# Patient Record
Sex: MALE | Race: WHITE | NOT HISPANIC OR LATINO | Employment: OTHER | URBAN - METROPOLITAN AREA
[De-identification: names, ages, dates, MRNs, and addresses within clinical notes are randomized per-mention and may not be internally consistent; named-entity substitution may affect disease eponyms.]

---

## 2017-12-04 ENCOUNTER — HOSPITAL ENCOUNTER (OUTPATIENT)
Dept: RADIOLOGY | Facility: MEDICAL CENTER | Age: 72
End: 2017-12-04

## 2017-12-15 ENCOUNTER — APPOINTMENT (OUTPATIENT)
Dept: RADIOLOGY | Facility: MEDICAL CENTER | Age: 72
End: 2017-12-15
Attending: SURGERY
Payer: MEDICARE

## 2017-12-15 ENCOUNTER — HOSPITAL ENCOUNTER (OUTPATIENT)
Facility: MEDICAL CENTER | Age: 72
End: 2017-12-15
Attending: SURGERY | Admitting: SURGERY
Payer: MEDICARE

## 2017-12-15 VITALS
BODY MASS INDEX: 29.57 KG/M2 | WEIGHT: 195.11 LBS | HEART RATE: 84 BPM | OXYGEN SATURATION: 91 % | SYSTOLIC BLOOD PRESSURE: 156 MMHG | HEIGHT: 68 IN | DIASTOLIC BLOOD PRESSURE: 88 MMHG | TEMPERATURE: 98.8 F | RESPIRATION RATE: 15 BRPM

## 2017-12-15 DIAGNOSIS — Z41.9 SURGERY, ELECTIVE: ICD-10-CM

## 2017-12-15 LAB
ALBUMIN SERPL BCP-MCNC: 3.6 G/DL (ref 3.2–4.9)
ALBUMIN/GLOB SERPL: 1.1 G/DL
ALP SERPL-CCNC: 547 U/L (ref 30–99)
ALT SERPL-CCNC: 65 U/L (ref 2–50)
ANION GAP SERPL CALC-SCNC: 6 MMOL/L (ref 0–11.9)
APTT PPP: 32.1 SEC (ref 24.7–36)
AST SERPL-CCNC: 105 U/L (ref 12–45)
BILIRUB SERPL-MCNC: 3 MG/DL (ref 0.1–1.5)
BUN SERPL-MCNC: 17 MG/DL (ref 8–22)
CALCIUM SERPL-MCNC: 9 MG/DL (ref 8.5–10.5)
CHLORIDE SERPL-SCNC: 104 MMOL/L (ref 96–112)
CO2 SERPL-SCNC: 22 MMOL/L (ref 20–33)
CREAT SERPL-MCNC: 0.63 MG/DL (ref 0.5–1.4)
ERYTHROCYTE [DISTWIDTH] IN BLOOD BY AUTOMATED COUNT: 48.9 FL (ref 35.9–50)
GFR SERPL CREATININE-BSD FRML MDRD: >60 ML/MIN/1.73 M 2
GLOBULIN SER CALC-MCNC: 3.2 G/DL (ref 1.9–3.5)
GLUCOSE SERPL-MCNC: 110 MG/DL (ref 65–99)
HCT VFR BLD AUTO: 44.5 % (ref 42–52)
HGB BLD-MCNC: 14.6 G/DL (ref 14–18)
MCH RBC QN AUTO: 29 PG (ref 27–33)
MCHC RBC AUTO-ENTMCNC: 32.8 G/DL (ref 33.7–35.3)
MCV RBC AUTO: 88.5 FL (ref 81.4–97.8)
PLATELET # BLD AUTO: 179 K/UL (ref 164–446)
PMV BLD AUTO: 10.9 FL (ref 9–12.9)
POTASSIUM SERPL-SCNC: 4.9 MMOL/L (ref 3.6–5.5)
PROT SERPL-MCNC: 6.8 G/DL (ref 6–8.2)
RBC # BLD AUTO: 5.03 M/UL (ref 4.7–6.1)
SODIUM SERPL-SCNC: 132 MMOL/L (ref 135–145)
WBC # BLD AUTO: 9.6 K/UL (ref 4.8–10.8)

## 2017-12-15 PROCEDURE — 160036 HCHG PACU - EA ADDL 30 MINS PHASE I: Performed by: SURGERY

## 2017-12-15 PROCEDURE — 700101 HCHG RX REV CODE 250

## 2017-12-15 PROCEDURE — 160035 HCHG PACU - 1ST 60 MINS PHASE I: Performed by: SURGERY

## 2017-12-15 PROCEDURE — 160009 HCHG ANES TIME/MIN: Performed by: SURGERY

## 2017-12-15 PROCEDURE — C1788 PORT, INDWELLING, IMP: HCPCS | Performed by: SURGERY

## 2017-12-15 PROCEDURE — 700111 HCHG RX REV CODE 636 W/ 250 OVERRIDE (IP)

## 2017-12-15 PROCEDURE — 160039 HCHG SURGERY MINUTES - EA ADDL 1 MIN LEVEL 3: Performed by: SURGERY

## 2017-12-15 PROCEDURE — 160028 HCHG SURGERY MINUTES - 1ST 30 MINS LEVEL 3: Performed by: SURGERY

## 2017-12-15 PROCEDURE — 160048 HCHG OR STATISTICAL LEVEL 1-5: Performed by: SURGERY

## 2017-12-15 PROCEDURE — 85730 THROMBOPLASTIN TIME PARTIAL: CPT

## 2017-12-15 PROCEDURE — 160025 RECOVERY II MINUTES (STATS): Performed by: SURGERY

## 2017-12-15 PROCEDURE — 80053 COMPREHEN METABOLIC PANEL: CPT

## 2017-12-15 PROCEDURE — 160046 HCHG PACU - 1ST 60 MINS PHASE II: Performed by: SURGERY

## 2017-12-15 PROCEDURE — A6402 STERILE GAUZE <= 16 SQ IN: HCPCS | Performed by: SURGERY

## 2017-12-15 PROCEDURE — 85027 COMPLETE CBC AUTOMATED: CPT

## 2017-12-15 PROCEDURE — 501838 HCHG SUTURE GENERAL: Performed by: SURGERY

## 2017-12-15 PROCEDURE — 160002 HCHG RECOVERY MINUTES (STAT): Performed by: SURGERY

## 2017-12-15 DEVICE — POWER PORTMRI COMPATABLE: Type: IMPLANTABLE DEVICE | Status: FUNCTIONAL

## 2017-12-15 RX ORDER — BUPIVACAINE HYDROCHLORIDE AND EPINEPHRINE 2.5; 5 MG/ML; UG/ML
INJECTION, SOLUTION EPIDURAL; INFILTRATION; INTRACAUDAL; PERINEURAL
Status: DISCONTINUED | OUTPATIENT
Start: 2017-12-15 | End: 2017-12-15 | Stop reason: HOSPADM

## 2017-12-15 RX ORDER — TRAMADOL HYDROCHLORIDE 50 MG/1
50 TABLET ORAL EVERY 6 HOURS PRN
Qty: 30 TAB | Refills: 1 | Status: SHIPPED | OUTPATIENT
Start: 2017-12-15 | End: 2018-12-15

## 2017-12-15 RX ORDER — SODIUM CHLORIDE, SODIUM LACTATE, POTASSIUM CHLORIDE, CALCIUM CHLORIDE 600; 310; 30; 20 MG/100ML; MG/100ML; MG/100ML; MG/100ML
INJECTION, SOLUTION INTRAVENOUS CONTINUOUS
Status: DISCONTINUED | OUTPATIENT
Start: 2017-12-15 | End: 2017-12-15 | Stop reason: HOSPADM

## 2017-12-15 RX ORDER — LIDOCAINE HYDROCHLORIDE 10 MG/ML
0.5 INJECTION, SOLUTION INFILTRATION; PERINEURAL
Status: DISCONTINUED | OUTPATIENT
Start: 2017-12-15 | End: 2017-12-15 | Stop reason: HOSPADM

## 2017-12-15 RX ORDER — ASPIRIN 81 MG/1
81 TABLET, CHEWABLE ORAL DAILY
COMMUNITY
End: 2018-02-12

## 2017-12-15 RX ORDER — HEPARIN SODIUM,PORCINE 1000/ML
VIAL (ML) INJECTION
Status: DISCONTINUED | OUTPATIENT
Start: 2017-12-15 | End: 2017-12-15 | Stop reason: HOSPADM

## 2017-12-15 RX ORDER — PYRIDOXINE HCL (VITAMIN B6) 100 MG
TABLET ORAL
COMMUNITY

## 2017-12-15 RX ORDER — LIDOCAINE AND PRILOCAINE 25; 25 MG/G; MG/G
1 CREAM TOPICAL
Status: DISCONTINUED | OUTPATIENT
Start: 2017-12-15 | End: 2017-12-15 | Stop reason: HOSPADM

## 2017-12-15 RX ORDER — PROMETHAZINE HYDROCHLORIDE 25 MG/1
12.5 TABLET ORAL EVERY 6 HOURS PRN
Qty: 30 TAB | Refills: 0 | Status: SHIPPED | OUTPATIENT
Start: 2017-12-15

## 2017-12-15 RX ORDER — HALOPERIDOL 5 MG/ML
INJECTION INTRAMUSCULAR
Status: DISCONTINUED
Start: 2017-12-15 | End: 2017-12-15 | Stop reason: HOSPADM

## 2017-12-15 ASSESSMENT — PAIN SCALES - GENERAL
PAINLEVEL_OUTOF10: 0

## 2017-12-15 NOTE — DISCHARGE INSTRUCTIONS
ACTIVITY: Rest and take it easy for the first 24 hours.  A responsible adult is recommended to remain with you during that time.  It is normal to feel sleepy.  We encourage you to not do anything that requires balance, judgment or coordination.    MILD FLU-LIKE SYMPTOMS ARE NORMAL. YOU MAY EXPERIENCE GENERALIZED MUSCLE ACHES, THROAT IRRITATION, HEADACHE AND/OR SOME NAUSEA.    FOR 24 HOURS DO NOT:  Drive, operate machinery or run household appliances.  Drink beer or alcoholic beverages.   Make important decisions or sign legal documents.    SPECIAL INSTRUCTIONS: *Follow instructions Dr SALAS has giving you**    DIET: To avoid nausea, slowly advance diet as tolerated, avoiding spicy or greasy foods for the first day.  Add more substantial food to your diet according to your physician's instructions.  Babies can be fed formula or breast milk as soon as they are hungry.  INCREASE FLUIDS AND FIBER TO AVOID CONSTIPATION.    SURGICAL DRESSING/BATHING: *You may shower tomorrow, do no submerge site until Dr says so  **    FOLLOW-UP APPOINTMENT:  A follow-up appointment should be arranged with your doctor,  call to schedule. [phone]    You should CALL YOUR PHYSICIAN if you develop:  Fever greater than 101 degrees F.  Pain not relieved by medication, or persistent nausea or vomiting.  Excessive bleeding (blood soaking through dressing) or unexpected drainage from the wound.  Extreme redness or swelling around the incision site, drainage of pus or foul smelling drainage.  Inability to urinate or empty your bladder within 8 hours.  Problems with breathing or chest pain.    You should call 911 if you develop problems with breathing or chest pain.  If you are unable to contact your doctor or surgical center, you should go to the nearest emergency room or urgent care center.  Physician's telephone #: *[phone]**    If any questions arise, call your doctor.  If your doctor is not available, please feel free to call the  Surgical Center at [phone].  The Center is open Monday through Friday from 7AM to 7PM.  You can also call the HEALTH HOTLINE open 24 hours/day, 7 days/week and speak to a nurse at [phone], or toll free at [phone].    A registered nurse may call you a few days after your surgery to see how you are doing after your procedure.    MEDICATIONS: Resume taking daily medication.  Take prescribed pain medication with food.  If no medication is prescribed, you may take non-aspirin pain medication if needed.  PAIN MEDICATION CAN BE VERY CONSTIPATING.  Take a stool softener or laxative such as senokot, pericolace, or milk of magnesia if needed.    Prescription given for *Ultram, Phenergan**.  Last pain medication given at *NONE**.    If your physician has prescribed pain medication that includes Acetaminophen (Tylenol), do not take additional Acetaminophen (Tylenol) while taking the prescribed medication.    Depression / Suicide Risk    As you are discharged from this [facility] facility, it is important to learn how to keep safe from harming yourself.    Recognize the warning signs:  · Abrupt changes in personality, positive or negative- including increase in energy   · Giving away possessions  · Change in eating patterns- significant weight changes-  positive or negative  · Change in sleeping patterns- unable to sleep or sleeping all the time   · Unwillingness or inability to communicate  · Depression  · Unusual sadness, discouragement and loneliness  · Talk of wanting to die  · Neglect of personal appearance   · Rebelliousness- reckless behavior  · Withdrawal from people/activities they love  · Confusion- inability to concentrate     If you or a loved one observes any of these behaviors or has concerns about self-harm, here's what you can do:  · Talk about it- your feelings and reasons for harming yourself  · Remove any means that you might use to hurt yourself (examples: pills, rope, extension cords,  firearm)  · Get professional help from the community (Mental Health, Substance Abuse, psychological counseling)  · Do not be alone:Call your Safe Contact- someone whom you trust who will be there for you.  · Call your local CRISIS HOTLINE 995-0806 or 973-574-8488  · Call your local Children's Mobile Crisis Response Team [place] (890) 691-6375 or www.Clarient  · Call the toll free National Suicide Prevention Hotlines   · National Suicide Prevention Lifeline 555-178-YXHX (0842)  · National Hope Line Network 800-SUICIDE (369-1671)

## 2017-12-15 NOTE — OP REPORT
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    MEDICAL ONCOLOGIST:  Dr. Gray.    RADIATION ONCOLOGIST:  Dr. Rodriguez.    PROCEDURE DONE:  Right cephalic cutdown PowerPort.    FINDINGS:  Fluoroscopically placed catheter to the superior vena cava atrial   junction with good inflow and outflow x3.    COMPLICATIONS:  No complications.    ESTIMATED BLOOD LOSS:  Less than 5 mL.    DESCRIPTION OF PROCEDURE:  The patient was brought to OR, placed under general   anesthetic with right arm tucked, left arm out, prepped with chlorhexidine   prep on the right neck, shoulder and chest wall.  The patient at that point   was given 5 mL of 0.5% Marcaine in the area of the right deltopectoral groove   and incision made with #10 blade for approximately 3-4 cm.  Using Bovie   electrocautery, we encountered a cephalic groove.  We opened up with   Metzenbaum scissors and found that the patient had a large cephalic vein.  We   got proximal and distal control with 3-0 Vicryl sutures.  The distal was tied.    The proximal was elevated.  A small venotomy was created and a 10-Kinyarwanda   flush PowerPort catheter was inserted under fluoroscopic guidance to the   superior vena cava atrial junction.  Once in position, it had great inflow and   outflow.  The proximal tie was tied.  At that point, the catheter was clamped   with a rubber shot.  Pocket was created with Bovie electrocautery.  The   catheter was cut, attached with the cuff to the flush PowerPort and at that   point, it was tested for a second time having great inflow and outflow.  At   that point, it was secured to the pectoral fascia with interrupted 2-0   Prolenes.  At that point, it was tested for a third time and found that to be    working extremely well.  Once completed, we then gave a total of 30 mL of half   strength Marcaine with epinephrine in the area of incision and soft tissue.    The soft tissue was brought together with interrupted 3-0 Vicryl, skin was   closed with 4-0 Monocryl.  Benzoin strips applied.  Patient was covered with   2x2, Tegaderm, extubated, and transferred to recovery.       ____________________________________     MD SHAJI LEE / NTS    DD:  12/15/2017 08:05:34  DT:  12/15/2017 08:57:56    D#:  [id number]  Job#:  [id number]    cc: DAVIN ALBERTO MD

## 2017-12-18 ENCOUNTER — HOSPITAL ENCOUNTER (OUTPATIENT)
Dept: RADIATION ONCOLOGY | Facility: MEDICAL CENTER | Age: 72
End: 2017-12-31
Attending: RADIOLOGY
Payer: MEDICARE

## 2017-12-18 ENCOUNTER — HOSPITAL ENCOUNTER (OUTPATIENT)
Dept: RADIOLOGY | Facility: MEDICAL CENTER | Age: 72
End: 2017-12-18

## 2017-12-18 VITALS
SYSTOLIC BLOOD PRESSURE: 147 MMHG | OXYGEN SATURATION: 95 % | HEIGHT: 68 IN | BODY MASS INDEX: 30.01 KG/M2 | WEIGHT: 198 LBS | DIASTOLIC BLOOD PRESSURE: 80 MMHG | TEMPERATURE: 98 F | HEART RATE: 73 BPM

## 2017-12-18 DIAGNOSIS — C22.1 CHOLANGIOCARCINOMA (HCC): ICD-10-CM

## 2017-12-18 DIAGNOSIS — B18.2 CHRONIC HEPATITIS C WITHOUT HEPATIC COMA (HCC): ICD-10-CM

## 2017-12-18 PROCEDURE — 99205 OFFICE O/P NEW HI 60 MIN: CPT | Performed by: RADIOLOGY

## 2017-12-18 PROCEDURE — 99214 OFFICE O/P EST MOD 30 MIN: CPT | Performed by: RADIOLOGY

## 2017-12-18 RX ORDER — DIPHENHYDRAMINE HCL 25 MG
25 TABLET ORAL EVERY 6 HOURS PRN
COMMUNITY

## 2017-12-18 NOTE — NON-PROVIDER
"Patient was seen today in clinic with Dr. Michael Newberry for Cholangiocarcinoma.  Vitals signs and weight were obtained and pain assessment was completed.  Allergies and medications were reviewed with the patient.  Review of systems completed.     Vitals/Pain:  Vitals:    12/18/17 1259   BP: 147/80   Pulse: 73   Temp: 36.7 °C (98 °F)   SpO2: 95%   Weight: 89.8 kg (198 lb)   Height: 1.727 m (5' 8\")      Pain Scale: 0-10  Pain Assessement: 4-5  Pain Location, Orientation and Scale: rt chest (recent implanted port insertion)  What makes the pain better: aspirin  What makes the pain worse: lifting right arm/lying on right side  Patient has occasional right flank pain.        Allergies:   Sulfa drugs    Current Medications:  Current Outpatient Prescriptions   Medication Sig Dispense Refill   • diphenhydrAMINE (BENADRYL) 25 MG Tab Take 25 mg by mouth every 6 hours as needed for Sleep.     • VITAMIN E PO Take  by mouth.     • multivitamin (THERAGRAN) Tab Take 1 Tab by mouth every day.     • Milk Thistle 200 MG Cap Take  by mouth.     • aspirin (ASA) 81 MG Chew Tab chewable tablet Take 81 mg by mouth every day.     • tramadol (ULTRAM) 50 MG Tab Take 1 Tab by mouth every 6 hours as needed for Moderate Pain for up to 30 doses. 30 Tab 1   • promethazine (PHENERGAN) 25 MG Tab Take 0.5 Tabs by mouth every 6 hours as needed for Nausea/Vomiting. 30 Tab 0     No current facility-administered medications for this encounter.          PCP:  No primary care provider on file.        Maryan Thomas R.N.  "

## 2017-12-18 NOTE — CONSULTS
RADIATION ONCOLOGY CONSULT    DATE OF SERVICE: 12/18/2017    IDENTIFICATION:   72-year-old male with unresectable Stage AMBROSIO [id number] intrahepatic hilar cholangiocarcinoma     HISTORY OF PRESENT ILLNESS: I had the pleasure of seeing Mr. Lundberg today in consultation at the request of Dr. Martins for unresectable cholangiocarcinoma. Patient initially presented with chronic cough and underwent CT chest October 25, 2017 scan by his primary care physician which was negative except for a large hypodense mass involving the central liver. MRI abdomen with contrast on November 2, 2017 showed prominent T2 hyperintense central liver mass with irregular areas of enhancement with prominent 2.5x1.8cm portacaval node and central portal vein thrombosis highly suspicious for cholangiocarcinoma. CT abdomen and pelvis on November 28, 2017 showed a 5.6 x 5.6 x 6.7 cm peripherally arterial enhancing mass with progressive persistent enhancement on delayed images. Mild enhancement of intrahepatic right-sided biliary duct with enhancing thrombus within main portal vein noted. Thrombus within a collateral vein and right portal system also observed. PET/CT scan on November 29, 2017 showed solitary focus of hypermetabolic activity within central liver with CT scan measurement of 5.6 x 6.7 cm from dome of liver to portal vein, maximum FDG uptake of 6.5. Biopsy per report from Dr. Rodrigo Butterfield showed cholangiocarcinoma. He is deemed unresectable at this point and recommendation is neoadjuvant chemotherapy and possibly chemoradiation therapy. Patient has been referred to Dr. Nat Gray Port-A-Cath was placed on December 15, 2017. Patient currently is doing well with no abdominal pain, jaundice, or bone pain. Appetite has been decreased, but minimal weight loss.      PAST MEDICAL HISTORY:   Past Medical History:   Diagnosis Date   • Cancer (CMS-HCC)     cholangiocarcinoma   • Hepatitis C     1980   • Hepatitis C, chronic  (CMS-[facility])        PAST SURGICAL HISTORY:  Past Surgical History:   Procedure Laterality Date   • CATH PLACEMENT Right 12/15/2017    Procedure: CATH PLACEMENT/CEPHALIC POWER PORT;  Surgeon: Rodrigo Pfeiffer M.D.;  Location: SURGERY [facility];  Service: General   • ARTHROSCOPY, KNEE Left    • OTHER      sinus surgery for polyp removal   • OTHER      surgery on left hand    • OTHER      retinal surgery - left       CURRENT MEDICATIONS:  Current Outpatient Prescriptions   Medication Sig Dispense Refill   • diphenhydrAMINE (BENADRYL) 25 MG Tab Take 25 mg by mouth every 6 hours as needed for Sleep.     • VITAMIN E PO Take  by mouth.     • multivitamin (THERAGRAN) Tab Take 1 Tab by mouth every day.     • Milk Thistle 200 MG Cap Take  by mouth.     • aspirin (ASA) 81 MG Chew Tab chewable tablet Take 81 mg by mouth every day.     • tramadol (ULTRAM) 50 MG Tab Take 1 Tab by mouth every 6 hours as needed for Moderate Pain for up to 30 doses. 30 Tab 1   • promethazine (PHENERGAN) 25 MG Tab Take 0.5 Tabs by mouth every 6 hours as needed for Nausea/Vomiting. 30 Tab 0     No current facility-administered medications for this encounter.        ALLERGIES:    Sulfa drugs    FAMILY HISTORY:    Family History   Problem Relation Age of Onset   • Cancer Mother      Pancreatic cancer   • Cancer Father      Multiple Myeloma       SOCIAL HISTORY:    Social History   Substance Use Topics   • Smoking status: Former Smoker   • Smokeless tobacco: Former User   • Alcohol use No     Patient is , lives in [place], [place] and has 3 children. 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    REVIEW OF SYSTEMS:  A greater than 10 point review of systems was completed in patient's chart on 12/18/2017 and scanned in to [facility].    The rest of the review of systems is negative and has been reviewed by me.  All are negative with relationship to this diagnosis with the exception of:Positive for decreased weight, change  "appetite, fatigue, abdominal pain, joint pain, cough, occasional shortness of breath          PHYSICAL EXAM:    Vitals:    17 1259   BP: 147/80   Pulse: 73   Temp: 36.7 °C (98 °F)   SpO2: 95%   Weight: 89.8 kg (198 lb)   Height: 1.727 m (5' 8\")          0= Fully active, able to carry on all pre-disease performance without restriction.     Pain Scale: 0-10  Pain Assessement: 4-5  Pain Location, Orientation and Scale: rt chest (recent implanted port insertion)  What makes the pain better: aspirin  What makes the pain worse: lifting right arm/lying on right side  Patient has occasional right flank pain.    GENERAL: No apparent distress.  HEENT:  Pupils are equal, round, and reactive to light.  Extraocular muscles   are intact. Sclerae nonicteric.  Conjunctivae pink.  Oral cavity, tongue   protrudes midline.   NECK:  Supple without evidence of thyromegaly.  NODES:  No peripheral adenopathy of the neck, supraclavicular fossa bilaterally.  LUNGS:  Good air movement  HEART:  Regular rate, no peripheral edema  ABDOMEN:  Soft. No evidence of hepatosplenomegaly.   EXTREMITIES:  Without Edema.  NEUROLOGIC:  Cranial nerves II through XII were intact. Normal stance and gait motor and sensory grossly within normal limits      IMAGIN17 MR abdomen with contrast        PETCT 17 CT abdomen        LABS:  Lab Results   Component Value Date/Time    WBC 9.6 12/15/2017 06:45 AM    RBC 5.03 12/15/2017 06:45 AM    HEMOGLOBIN 14.6 12/15/2017 06:45 AM    HEMATOCRIT 44.5 12/15/2017 06:45 AM    MCV 88.5 12/15/2017 06:45 AM    MCH 29.0 12/15/2017 06:45 AM    MCHC 32.8 (L) 12/15/2017 06:45 AM    MPV 10.9 12/15/2017 06:45 AM      Lab Results   Component Value Date/Time    SODIUM 132 (L) 12/15/2017 06:45 AM    POTASSIUM 4.9 12/15/2017 06:45 AM    CHLORIDE 104 12/15/2017 06:45 AM    CO2 22 12/15/2017 06:45 AM    GLUCOSE 110 (H) 12/15/2017 06:45 AM    BUN 17 12/15/2017 06:45 AM    CREATININE 0.63 12/15/2017 06:45 " "AM        PATHOLOGY:  Per Dr. Skinner note from [facility]    IMPRESSION:   72-year-old male with unresectable Stage AMBROSIO [id number] intrahepatic hilar cholangiocarcinoma     RECOMMENDATIONS:   We discussed the general treatment recommendation for unresectable intrahepatic cholangiocarcinoma. We discussed the goal of any local therapy is for control of the lesion and slowing of the overall disease process. We compared and contrasted the treatment options including surgery, chemotherapy, thermal ablation, and radiotherapy. We discussed the risks and benefits of treatment. However the tumor and his current form is currently unresectable.    The patient may be a candidate for stereotactic ablative radiotherapy based on the following publication by Kraig et al. \"Ablative Radiotherapy Doses Lead to a Substantial Prolongation of Survival in Patients With Inoperable Intrahepatic Cholangiocarcinoma: A Retrospective Dose Response Analysis.\"J Clin Oncol. 2016 Jan 20;34(3):219-26. doi: 10.1200/JCO.2015.61.3778. Epub 2015 Oct 26. And Heraclio WRIGHT et al \"Multi-Institutional Phase II Study of High-Dose Hypofractionated Proton Beam Therapy in Patients With Localized, Unresectable Hepatocellular Carcinoma and Intrahepatic Cholangiocarcinoma.\" J Clin Oncol. 2016 Feb 10;34(5):460-8. doi: 10.1200/JCO.2015.64.2710. Epub 2015 Dec 14.     We discussed the risks of treatment at length including liver injury, radiation induced liver disease, liver failure, ascites, shortness of breath, lung fibrosis, pneumonitis, acute and chronic esophageal, stomach, or bowel injury, spinal cord injury which is rare, skin toxicity, chest or abdominal wall injury, pain, dehydration, supplemental oxygen use or dependence,.and the risk of treatment related death.     The patient would like to proceed forward with treatment and we will set up CT simulation for treatment planning imaging after neoadjuvant chemotherapy with Dr. Gray. That will consist " of supine immobilization, 4D imaging for assessment of tumor motion, possibly IV contrast depending on kidney function and targeting requirements, appropriate skin surface marking for radiation treatment. We will then complete the behind the scenes planning process over several days using appropriate image fusion, target delineation, dosimetry,  checks, and treatment scheduling.    Patient will call us after appointment with Dr. Gray to determine best treatment approach, but I explained that typically we recommend neoadjuvant chemotherapy with cis/gem for 3-4 cycles followed by restaging scans followed by liver-directed radiation therapy with or without xeloda depending on fraction size which is tumor volume dependent followed by outback gem/cis for 4 cycles.    They were given my contact information and encouraged to call with questions or concerns.     Thank you for the opportunity to participate in his care.  If any questions or comments, please do not hesitate in calling.

## 2018-02-12 ENCOUNTER — OFFICE VISIT (OUTPATIENT)
Dept: MEDICAL GROUP | Facility: MEDICAL CENTER | Age: 73
End: 2018-02-12
Payer: MEDICARE

## 2018-02-12 VITALS
SYSTOLIC BLOOD PRESSURE: 122 MMHG | WEIGHT: 199 LBS | TEMPERATURE: 98.4 F | BODY MASS INDEX: 30.16 KG/M2 | DIASTOLIC BLOOD PRESSURE: 70 MMHG | OXYGEN SATURATION: 96 % | HEIGHT: 68 IN | RESPIRATION RATE: 20 BRPM | HEART RATE: 89 BPM

## 2018-02-12 DIAGNOSIS — C22.1 CHOLANGIOCARCINOMA (HCC): ICD-10-CM

## 2018-02-12 DIAGNOSIS — B18.2 CHRONIC HEPATITIS C WITHOUT HEPATIC COMA (HCC): Primary | ICD-10-CM

## 2018-02-12 DIAGNOSIS — L03.811 CELLULITIS OF HEAD EXCEPT FACE: ICD-10-CM

## 2018-02-12 DIAGNOSIS — N30.00 ACUTE CYSTITIS WITHOUT HEMATURIA: ICD-10-CM

## 2018-02-12 PROBLEM — G44.011 INTRACTABLE EPISODIC CLUSTER HEADACHE: Status: ACTIVE | Noted: 2018-02-12

## 2018-02-12 PROCEDURE — 99204 OFFICE O/P NEW MOD 45 MIN: CPT | Performed by: FAMILY MEDICINE

## 2018-02-12 RX ORDER — CIPROFLOXACIN 500 MG/1
500 TABLET, FILM COATED ORAL 2 TIMES DAILY
Qty: 20 TAB | Refills: 0 | Status: SHIPPED | DISCHARGE
Start: 2018-02-12 | End: 2018-02-22

## 2018-02-12 ASSESSMENT — PATIENT HEALTH QUESTIONNAIRE - PHQ9: CLINICAL INTERPRETATION OF PHQ2 SCORE: 0

## 2018-02-13 NOTE — ASSESSMENT & PLAN NOTE
Seeing Dr Newberry, Dr SALAS hepatobiliary surgeon, and dr Gray   Going through chemo, and likely plan for radiosurgery   Potentially going to [facility] for surgery
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
## 2018-03-29 ENCOUNTER — HOSPITAL ENCOUNTER (OUTPATIENT)
Dept: RADIOLOGY | Facility: MEDICAL CENTER | Age: 73
End: 2018-03-29
Attending: PHYSICIAN ASSISTANT
Payer: MEDICARE

## 2018-03-29 DIAGNOSIS — Z51.11 ENCOUNTER FOR ANTINEOPLASTIC CHEMOTHERAPY: ICD-10-CM

## 2018-03-29 DIAGNOSIS — C22.1 INTRAHEPATIC BILE DUCT CYSTADENOCARCINOMA (HCC): ICD-10-CM

## 2018-03-29 PROCEDURE — 71046 X-RAY EXAM CHEST 2 VIEWS: CPT

## 2018-04-09 ENCOUNTER — HOSPITAL ENCOUNTER (OUTPATIENT)
Dept: RADIOLOGY | Facility: MEDICAL CENTER | Age: 73
End: 2018-04-09

## 2018-04-13 ENCOUNTER — HOSPITAL ENCOUNTER (OUTPATIENT)
Dept: RADIOLOGY | Facility: MEDICAL CENTER | Age: 73
End: 2018-04-13

## 2018-04-13 ENCOUNTER — HOSPITAL ENCOUNTER (OUTPATIENT)
Dept: RADIOLOGY | Facility: MEDICAL CENTER | Age: 73
End: 2018-04-13
Attending: SURGERY
Payer: MEDICARE

## 2018-04-13 ENCOUNTER — HOSPITAL ENCOUNTER (OUTPATIENT)
Dept: LAB | Facility: MEDICAL CENTER | Age: 73
End: 2018-04-13
Attending: INTERNAL MEDICINE
Payer: MEDICARE

## 2018-04-13 DIAGNOSIS — C24.0 HILAR CHOLANGIOCARCINOMA (HCC): ICD-10-CM

## 2018-04-13 LAB
APPEARANCE FLD: NORMAL
BODY FLD TYPE: NORMAL
BODY FLD TYPE: NORMAL
COLOR FLD: YELLOW
CSF COMMENTS 1658: NORMAL
CYTOLOGY REG CYTOL: NORMAL
HISTIOCYTES NFR FLD: 3 %
INR PPP: 1.38 (ref 0.87–1.13)
LDH FLD L TO P-CCNC: 56 U/L
LYMPHOCYTES NFR FLD: 72 %
MESOTHL CELL NFR FLD: 9 %
MONONUC CELLS NFR FLD: 10 %
NEUTROPHILS NFR FLD: 6 %
PROT FLD-MCNC: <1 G/DL
PROTHROMBIN TIME: 16.7 SEC (ref 12–14.6)
RBC # FLD: <2000 CELLS/UL
WBC # FLD: 128 CELLS/UL

## 2018-04-13 PROCEDURE — 49083 ABD PARACENTESIS W/IMAGING: CPT

## 2018-04-13 PROCEDURE — 88112 CYTOPATH CELL ENHANCE TECH: CPT

## 2018-04-13 PROCEDURE — 84157 ASSAY OF PROTEIN OTHER: CPT

## 2018-04-13 PROCEDURE — 87070 CULTURE OTHR SPECIMN AEROBIC: CPT

## 2018-04-13 PROCEDURE — 89051 BODY FLUID CELL COUNT: CPT

## 2018-04-13 PROCEDURE — 83615 LACTATE (LD) (LDH) ENZYME: CPT

## 2018-04-13 PROCEDURE — 85610 PROTHROMBIN TIME: CPT

## 2018-04-13 PROCEDURE — 88305 TISSUE EXAM BY PATHOLOGIST: CPT

## 2018-04-13 PROCEDURE — 87205 SMEAR GRAM STAIN: CPT

## 2018-04-13 PROCEDURE — 36415 COLL VENOUS BLD VENIPUNCTURE: CPT

## 2018-04-14 LAB
GRAM STN SPEC: NORMAL
SIGNIFICANT IND 70042: NORMAL
SITE SITE: NORMAL
SOURCE SOURCE: NORMAL

## 2018-04-16 LAB
BACTERIA FLD AEROBE CULT: NORMAL
GRAM STN SPEC: NORMAL
SIGNIFICANT IND 70042: NORMAL
SITE SITE: NORMAL
SOURCE SOURCE: NORMAL

## 2018-04-26 ENCOUNTER — HOSPITAL ENCOUNTER (OUTPATIENT)
Dept: RADIOLOGY | Facility: MEDICAL CENTER | Age: 73
End: 2018-04-26
Attending: INTERNAL MEDICINE
Payer: MEDICARE

## 2018-04-26 DIAGNOSIS — I82.A21 CHRONIC DEEP VEIN THROMBOSIS (DVT) OF AXILLARY VEIN OF RIGHT UPPER EXTREMITY (HCC): ICD-10-CM

## 2018-04-26 DIAGNOSIS — I82.601 ACUTE EMBOLISM AND THROMBOSIS OF VEIN OF RIGHT UPPER EXTREMITY: ICD-10-CM

## 2018-04-26 DIAGNOSIS — C22.1 MALIGNANT NEOPLASM OF INTRAHEPATIC BILE DUCTS (HCC): ICD-10-CM

## 2018-04-26 DIAGNOSIS — I82.90 ACUTE VENOUS THROMBOSIS: ICD-10-CM

## 2018-04-26 DIAGNOSIS — I82.609: ICD-10-CM

## 2018-04-26 PROCEDURE — 49083 ABD PARACENTESIS W/IMAGING: CPT

## 2018-04-26 PROCEDURE — 93971 EXTREMITY STUDY: CPT | Mod: RT

## 2018-04-26 NOTE — PROGRESS NOTES
"Patient given [facility] \"Preventing the Spread of Infection\" Brochure upon being checked in.     US guided therapeutic paracentesis done by Dr. Sunshine; RLQ access site; 2950mL obtained and discarded; procedural RN: Mary;  pt tolerated the procedure well; pt vitals remained stable pre/intra/post procedure; please reference scanned paper chart for all recorded vitals; all questions and concerns answered prior to d/c; patient provided with all appropriate education for procedure; pt d/c home.            "

## 2018-04-27 ENCOUNTER — HOSPITAL ENCOUNTER (OUTPATIENT)
Dept: RADIOLOGY | Facility: MEDICAL CENTER | Age: 73
End: 2018-04-27

## 2018-05-14 ENCOUNTER — HOSPITAL ENCOUNTER (OUTPATIENT)
Dept: RADIOLOGY | Facility: MEDICAL CENTER | Age: 73
End: 2018-05-14
Attending: INTERNAL MEDICINE
Payer: MEDICARE

## 2018-05-14 VITALS — HEART RATE: 90 BPM | SYSTOLIC BLOOD PRESSURE: 139 MMHG | OXYGEN SATURATION: 95 % | DIASTOLIC BLOOD PRESSURE: 73 MMHG

## 2018-05-14 DIAGNOSIS — C22.1 INTRAHEPATIC BILE DUCT CYSTADENOCARCINOMA (HCC): ICD-10-CM

## 2018-05-14 PROCEDURE — 49083 ABD PARACENTESIS W/IMAGING: CPT

## 2018-05-14 NOTE — OR SURGEON
Immediate Post- Operative Note        PostOp Diagnosis: ascites      Procedure(s): US guided paracentesis      Estimated Blood Loss: Less than 5 ml        Complications: None            5/14/2018     4:17 PM     Keenan Arrington

## 2018-05-16 ENCOUNTER — TELEPHONE (OUTPATIENT)
Dept: RADIATION ONCOLOGY | Facility: MEDICAL CENTER | Age: 73
End: 2018-05-16

## 2018-05-16 NOTE — TELEPHONE ENCOUNTER
called patient and spoke to wife, Mary. Per Dr. Newberry, a f/u appointment was offered to discuss treatment to help with pain. Patient and wife stated that right now he is not having any pain just a problem with swollen feet. Patients wife stated that they will talk with Dr. Gray tomorrow regarding options. Advised patients wife to give me a call if they change their mind and I can get them scheduled for f/u.

## 2018-06-15 ENCOUNTER — TELEPHONE (OUTPATIENT)
Dept: MEDICAL GROUP | Facility: MEDICAL CENTER | Age: 73
End: 2018-06-15

## 2018-06-15 NOTE — TELEPHONE ENCOUNTER
Rupal (Case Mngr at [facility] ) [phone] states that Pt is currently hospitalized with them. Pt. Is due to be discharged but will need Home infusion antibiotics therapy. The hospitalist can't sign home infusion therapy. They are asking if you'd sign the orders. I asked that they fax all notes in order for you to review. Pt's wife will arrange for a hospital follow up visit once pt's discharged.   Dr. Rice, I'll put the orders on your desk.

## (undated) DEVICE — STERI STRIP COMPOUND BENZOIN - TINCTURE 0.6ML WITH APPLICATOR (40EA/BX)

## (undated) DEVICE — BLADE SURGICAL #11 - (50/BX)

## (undated) DEVICE — DRAPE IOBAN II INCISE 23X17 - (10EA/BX 4BX/CA)

## (undated) DEVICE — KIT ROOM DECONTAMINATION

## (undated) DEVICE — NEPTUNE 4 PORT MANIFOLD - (20/PK)

## (undated) DEVICE — SLEEVE, VASO, THIGH, MED

## (undated) DEVICE — DRESSING TRANSPARENT FILM TEGADERM 4 X 4.75" (50EA/BX)"

## (undated) DEVICE — SENSOR SPO2 NEO LNCS ADHESIVE (20/BX) SEE USER NOTES

## (undated) DEVICE — SUTURE 2-0 PROLENE SH (36PK/BX)

## (undated) DEVICE — DRAPE C-ARM LARGE 41IN X 74 IN - (10/BX 2BX/CA)

## (undated) DEVICE — HEAD HOLDER JUNIOR/ADULT

## (undated) DEVICE — CANISTER SUCTION 3000ML MECHANICAL FILTER AUTO SHUTOFF MEDI-VAC NONSTERILE LF DISP  (40EA/CA)

## (undated) DEVICE — SPONGE GAUZESTER. 2X2 4-PL - (2/PK 50PK/BX 30BX/CS)

## (undated) DEVICE — SUTURE 4-0 MONOCRYL PLUS PS-2 - 27 INCH (36/BX)

## (undated) DEVICE — SET LEADWIRE 5 LEAD BEDSIDE DISPOSABLE ECG (1SET OF 5/EA)

## (undated) DEVICE — SODIUM CHL IRRIGATION 0.9% 1000ML (12EA/CA)

## (undated) DEVICE — KIT ANESTHESIA W/CIRCUIT & 3/LT BAG W/FILTER (20EA/CA)

## (undated) DEVICE — ELECTRODE 850 FOAM ADHESIVE - HYDROGEL RADIOTRNSPRNT (50/PK)

## (undated) DEVICE — GLOVE BIOGEL PI INDICATOR SZ 7.0 SURGICAL PF LF - (50/BX 4BX/CA)

## (undated) DEVICE — GLOVE BIOGEL SZ 8 SURGICAL PF LTX - (50PR/BX 4BX/CA)

## (undated) DEVICE — SUTURE GENERAL

## (undated) DEVICE — TUBING CLEARLINK DUO-VENT - C-FLO (48EA/CA)

## (undated) DEVICE — GLOVE SZ 6.5 BIOGEL PI MICRO - PF LF (50PR/BX)

## (undated) DEVICE — SYRINGE 30 ML LL (56/BX)

## (undated) DEVICE — PACK MINOR BASIN - (2EA/CA)

## (undated) DEVICE — CHLORAPREP 26 ML APPLICATOR - ORANGE TINT(25/CA)

## (undated) DEVICE — DRAPE LAPAROTOMY T SHEET - (12EA/CA)

## (undated) DEVICE — PROTECTOR ULNA NERVE - (36PR/CA)

## (undated) DEVICE — CLOSURE SKIN STRIP 1/2 X 4 IN - (STERI STRIP) (50/BX 4BX/CA)

## (undated) DEVICE — GOWN WARMING STANDARD FLEX - (30/CA)

## (undated) DEVICE — ELECTRODE DUAL RETURN W/ CORD - (50/PK)

## (undated) DEVICE — SUTURE 3-0 VICRYL PLUS SH - 8X 18 INCH (12/BX)

## (undated) DEVICE — LACTATED RINGERS INJ 1000 ML - (14EA/CA 60CA/PF)

## (undated) DEVICE — BAG DECANTER (50EA/CS)

## (undated) DEVICE — SYRINGE 10 ML CONTROL LL (25EA/BX 4BX/CA)

## (undated) DEVICE — MASK ANESTHESIA ADULT  - (100/CA)

## (undated) DEVICE — SET EXTENSION WITH 2 PORTS (48EA/CA) ***PART #2C8610 IS A SUBSTITUTE*****

## (undated) DEVICE — SUTURE 2-0 PROLENE SH D/A (36PK/BX)

## (undated) DEVICE — SODIUM CHL. INJ. 0.9% 500ML (24EA/CA 50CA/PF)

## (undated) DEVICE — SUCTION INSTRUMENT YANKAUER BULBOUS TIP W/O VENT (50EA/CA)